# Patient Record
Sex: FEMALE | Race: BLACK OR AFRICAN AMERICAN | NOT HISPANIC OR LATINO | Employment: FULL TIME | URBAN - METROPOLITAN AREA
[De-identification: names, ages, dates, MRNs, and addresses within clinical notes are randomized per-mention and may not be internally consistent; named-entity substitution may affect disease eponyms.]

---

## 2023-10-21 ENCOUNTER — HOSPITAL ENCOUNTER (EMERGENCY)
Facility: HOSPITAL | Age: 33
Discharge: HOME | End: 2023-10-21
Attending: EMERGENCY MEDICINE
Payer: COMMERCIAL

## 2023-10-21 ENCOUNTER — APPOINTMENT (OUTPATIENT)
Dept: RADIOLOGY | Facility: HOSPITAL | Age: 33
End: 2023-10-21
Payer: COMMERCIAL

## 2023-10-21 VITALS
SYSTOLIC BLOOD PRESSURE: 131 MMHG | BODY MASS INDEX: 27.4 KG/M2 | OXYGEN SATURATION: 99 % | HEART RATE: 88 BPM | TEMPERATURE: 97.7 F | RESPIRATION RATE: 16 BRPM | HEIGHT: 69 IN | DIASTOLIC BLOOD PRESSURE: 86 MMHG | WEIGHT: 185 LBS

## 2023-10-21 DIAGNOSIS — M54.6 ACUTE BILATERAL THORACIC BACK PAIN: Primary | ICD-10-CM

## 2023-10-21 DIAGNOSIS — R07.9 CHEST PAIN, UNSPECIFIED TYPE: ICD-10-CM

## 2023-10-21 DIAGNOSIS — U07.1 COVID: ICD-10-CM

## 2023-10-21 LAB
ALBUMIN SERPL BCP-MCNC: 4.3 G/DL (ref 3.4–5)
ALP SERPL-CCNC: 44 U/L (ref 33–110)
ALT SERPL W P-5'-P-CCNC: 19 U/L (ref 7–45)
ANION GAP SERPL CALC-SCNC: 8 MMOL/L (ref 10–20)
AST SERPL W P-5'-P-CCNC: 23 U/L (ref 9–39)
BASOPHILS # BLD MANUAL: 0.03 X10*3/UL (ref 0–0.1)
BASOPHILS NFR BLD MANUAL: 1 %
BILIRUB SERPL-MCNC: 0.2 MG/DL (ref 0–1.2)
BUN SERPL-MCNC: 7 MG/DL (ref 6–23)
CALCIUM SERPL-MCNC: 8.7 MG/DL (ref 8.6–10.3)
CARDIAC TROPONIN I PNL SERPL HS: <3 NG/L (ref 0–13)
CHLORIDE SERPL-SCNC: 107 MMOL/L (ref 98–107)
CO2 SERPL-SCNC: 25 MMOL/L (ref 21–32)
CREAT SERPL-MCNC: 0.68 MG/DL (ref 0.5–1.05)
EOSINOPHIL # BLD MANUAL: 0.16 X10*3/UL (ref 0–0.7)
EOSINOPHIL NFR BLD MANUAL: 6 %
ERYTHROCYTE [DISTWIDTH] IN BLOOD BY AUTOMATED COUNT: 15.9 % (ref 11.5–14.5)
GFR SERPL CREATININE-BSD FRML MDRD: >90 ML/MIN/1.73M*2
GLUCOSE SERPL-MCNC: 96 MG/DL (ref 74–99)
HCT VFR BLD AUTO: 37.2 % (ref 36–46)
HGB BLD-MCNC: 12 G/DL (ref 12–16)
IMM GRANULOCYTES # BLD AUTO: 0 X10*3/UL (ref 0–0.7)
IMM GRANULOCYTES NFR BLD AUTO: 0 % (ref 0–0.9)
LYMPHOCYTES # BLD MANUAL: 1.51 X10*3/UL (ref 1.2–4.8)
LYMPHOCYTES NFR BLD MANUAL: 56 %
MCH RBC QN AUTO: 27.6 PG (ref 26–34)
MCHC RBC AUTO-ENTMCNC: 32.3 G/DL (ref 32–36)
MCV RBC AUTO: 86 FL (ref 80–100)
MONOCYTES # BLD MANUAL: 0.3 X10*3/UL (ref 0.1–1)
MONOCYTES NFR BLD MANUAL: 11 %
NEUTROPHILS # BLD MANUAL: 0.62 X10*3/UL (ref 1.2–7.7)
NEUTS BAND # BLD MANUAL: 0.05 X10*3/UL (ref 0–0.7)
NEUTS BAND NFR BLD MANUAL: 2 %
NEUTS SEG # BLD MANUAL: 0.57 X10*3/UL (ref 1.2–7)
NEUTS SEG NFR BLD MANUAL: 21 %
NRBC BLD-RTO: 0 /100 WBCS (ref 0–0)
OVALOCYTES BLD QL SMEAR: ABNORMAL
PLATELET # BLD AUTO: 203 X10*3/UL (ref 150–450)
PMV BLD AUTO: 10.1 FL (ref 7.5–11.5)
POTASSIUM SERPL-SCNC: 3.6 MMOL/L (ref 3.5–5.3)
PROT SERPL-MCNC: 7.8 G/DL (ref 6.4–8.2)
RBC # BLD AUTO: 4.34 X10*6/UL (ref 4–5.2)
RBC MORPH BLD: ABNORMAL
SARS-COV-2 RNA RESP QL NAA+PROBE: DETECTED
SODIUM SERPL-SCNC: 136 MMOL/L (ref 136–145)
TOTAL CELLS COUNTED BLD: 100
VARIANT LYMPHS # BLD MANUAL: 0.08 X10*3/UL (ref 0–0.5)
VARIANT LYMPHS NFR BLD: 3 %
WBC # BLD AUTO: 2.7 X10*3/UL (ref 4.4–11.3)

## 2023-10-21 PROCEDURE — 85027 COMPLETE CBC AUTOMATED: CPT

## 2023-10-21 PROCEDURE — 99284 EMERGENCY DEPT VISIT MOD MDM: CPT | Performed by: EMERGENCY MEDICINE

## 2023-10-21 PROCEDURE — 80053 COMPREHEN METABOLIC PANEL: CPT

## 2023-10-21 PROCEDURE — 99283 EMERGENCY DEPT VISIT LOW MDM: CPT | Mod: 25

## 2023-10-21 PROCEDURE — 2500000004 HC RX 250 GENERAL PHARMACY W/ HCPCS (ALT 636 FOR OP/ED)

## 2023-10-21 PROCEDURE — 87635 SARS-COV-2 COVID-19 AMP PRB: CPT

## 2023-10-21 PROCEDURE — 96360 HYDRATION IV INFUSION INIT: CPT

## 2023-10-21 PROCEDURE — 84484 ASSAY OF TROPONIN QUANT: CPT

## 2023-10-21 PROCEDURE — 85007 BL SMEAR W/DIFF WBC COUNT: CPT

## 2023-10-21 PROCEDURE — 71045 X-RAY EXAM CHEST 1 VIEW: CPT | Mod: FY

## 2023-10-21 PROCEDURE — 36415 COLL VENOUS BLD VENIPUNCTURE: CPT

## 2023-10-21 PROCEDURE — 71045 X-RAY EXAM CHEST 1 VIEW: CPT | Performed by: RADIOLOGY

## 2023-10-21 RX ORDER — ACETAMINOPHEN 325 MG/1
650 TABLET ORAL ONCE
Status: COMPLETED | OUTPATIENT
Start: 2023-10-21 | End: 2023-10-21

## 2023-10-21 RX ORDER — IBUPROFEN 600 MG/1
600 TABLET ORAL ONCE
Status: DISCONTINUED | OUTPATIENT
Start: 2023-10-21 | End: 2023-10-21

## 2023-10-21 RX ADMIN — SODIUM CHLORIDE, POTASSIUM CHLORIDE, SODIUM LACTATE AND CALCIUM CHLORIDE 1000 ML: 600; 310; 30; 20 INJECTION, SOLUTION INTRAVENOUS at 02:27

## 2023-10-21 RX ADMIN — ACETAMINOPHEN 650 MG: 325 TABLET ORAL at 02:26

## 2023-10-21 ASSESSMENT — LIFESTYLE VARIABLES
HAVE PEOPLE ANNOYED YOU BY CRITICIZING YOUR DRINKING: NO
HAVE YOU EVER FELT YOU SHOULD CUT DOWN ON YOUR DRINKING: NO
EVER FELT BAD OR GUILTY ABOUT YOUR DRINKING: NO
EVER HAD A DRINK FIRST THING IN THE MORNING TO STEADY YOUR NERVES TO GET RID OF A HANGOVER: NO
REASON UNABLE TO ASSESS: NO

## 2023-10-21 ASSESSMENT — PAIN DESCRIPTION - LOCATION
LOCATION: BACK
LOCATION: BACK

## 2023-10-21 ASSESSMENT — PAIN DESCRIPTION - FREQUENCY: FREQUENCY: CONSTANT/CONTINUOUS

## 2023-10-21 ASSESSMENT — COLUMBIA-SUICIDE SEVERITY RATING SCALE - C-SSRS
2. HAVE YOU ACTUALLY HAD ANY THOUGHTS OF KILLING YOURSELF?: NO
6. HAVE YOU EVER DONE ANYTHING, STARTED TO DO ANYTHING, OR PREPARED TO DO ANYTHING TO END YOUR LIFE?: NO
2. HAVE YOU ACTUALLY HAD ANY THOUGHTS OF KILLING YOURSELF?: NO
1. IN THE PAST MONTH, HAVE YOU WISHED YOU WERE DEAD OR WISHED YOU COULD GO TO SLEEP AND NOT WAKE UP?: NO
1. IN THE PAST MONTH, HAVE YOU WISHED YOU WERE DEAD OR WISHED YOU COULD GO TO SLEEP AND NOT WAKE UP?: NO

## 2023-10-21 ASSESSMENT — PAIN SCALES - GENERAL
PAINLEVEL_OUTOF10: 8
PAINLEVEL_OUTOF10: 8

## 2023-10-21 ASSESSMENT — HEART SCORE
TROPONIN: LESS THAN OR EQUAL TO NORMAL LIMIT
HISTORY: SLIGHTLY SUSPICIOUS
RISK FACTORS: 1-2 RISK FACTORS
HEART SCORE: 1
AGE: <45
ECG: NORMAL

## 2023-10-21 ASSESSMENT — PAIN DESCRIPTION - ORIENTATION: ORIENTATION: MID;LOWER

## 2023-10-21 ASSESSMENT — PAIN - FUNCTIONAL ASSESSMENT
PAIN_FUNCTIONAL_ASSESSMENT: 0-10
PAIN_FUNCTIONAL_ASSESSMENT: 0-10

## 2023-10-21 ASSESSMENT — PAIN DESCRIPTION - DESCRIPTORS: DESCRIPTORS: DISCOMFORT;ACHING

## 2023-10-21 NOTE — ED PROVIDER NOTES
EMERGENCY DEPARTMENT ENCOUNTER      Pt Name: Yonathan Alfredo Alqaddi  MRN: [id number]  Birthdate 1990  Date of evaluation: 10/21/2023  Provider: Howard Velasquez DO    CHIEF COMPLAINT       Chief Complaint   Patient presents with    Back Pain     Patient presents tonight with mid thoracic pain that has been ongoing for the last 2 days, no known injury         HISTORY OF PRESENT ILLNESS    33-year-old female comes to the emergency room with multiple medical complaints.  She been having pain across her lower thoracic back going on for 2 days.  No specific exacerbating or remitting factors.  She has been also having some chest discomfort over this time period as well but said that these 2 symptoms are not associated.  She is also been having some lightheadedness since last night specifically on positional changes such as standing up from sitting down.  Patient denies any medical problems, does not take any medications.  No history of coronary disease in the family, no history of aneurysms thoracic or abdominal for the patient or in the family.  Patient does smoke.  No regular drug or alcohol use.  No nausea, vomiting, fevers, chills, abdominal pain, urinary symptoms.  No black stools or red stools.  No other symptoms today.  No recent surgeries, travel, history of blood clots, systemic hormone therapy use.      History provided by:  Patient      Nursing Notes were reviewed.    PAST MEDICAL HISTORY     Past Medical History:   Diagnosis Date    Arthritis          SURGICAL HISTORY     History reviewed. No pertinent surgical history.      CURRENT MEDICATIONS       There are no discharge medications for this patient.      ALLERGIES     Patient has no known allergies.    FAMILY HISTORY     No family history on file.       SOCIAL HISTORY       Social History     Socioeconomic History    Marital status: Single     Spouse name: None    Number of children: None    Years of education: None    Highest education level: None    Occupational History    None   Tobacco Use    Smoking status: Every Day     Types: Cigarettes, Cigars    Smokeless tobacco: None   Substance and Sexual Activity    Alcohol use: Yes     Comment: sociallly    Drug use: Never    Sexual activity: None   Other Topics Concern    None   Social History Narrative    None     Social Determinants of Health     Financial Resource Strain: Not on file   Food Insecurity: Not on file   Transportation Needs: Not on file   Physical Activity: Not on file   Stress: Not on file   Social Connections: Not on file   Intimate Partner Violence: Not on file   Housing Stability: Not on file       SCREENINGS                        PHYSICAL EXAM    (up to 7 for level 4, 8 or more for level 5)     ED Triage Vitals [10/21/23 0140]   Temp Heart Rate Resp BP   36.5 °C (97.7 °F) 96 16 (!) 145/92      SpO2 Temp Source Heart Rate Source Patient Position   99 % Skin Monitor Sitting      BP Location FiO2 (%)     Right arm --       Physical Exam  Vitals and nursing note reviewed.   Constitutional:       General: She is not in acute distress.  HENT:      Head: Normocephalic and atraumatic.   Eyes:      General: No scleral icterus.        Right eye: No discharge.         Left eye: No discharge.      Conjunctiva/sclera: Conjunctivae normal.   Cardiovascular:      Rate and Rhythm: Normal rate and regular rhythm.      Pulses: Normal pulses.   Pulmonary:      Effort: Pulmonary effort is normal.   Abdominal:      General: Abdomen is flat.      Palpations: Abdomen is soft.      Tenderness: There is no abdominal tenderness. There is no guarding or rebound.   Musculoskeletal:         General: No deformity.      Right lower leg: No edema.      Left lower leg: No edema.   Skin:     General: Skin is warm and dry.   Neurological:      Mental Status: She is alert and oriented to person, place, and time. Mental status is at baseline.   Psychiatric:         Mood and Affect: Mood normal.         Behavior: Behavior  normal.          DIAGNOSTIC RESULTS     LABS:  Labs Reviewed   CBC WITH AUTO DIFFERENTIAL - Abnormal       Result Value    WBC 2.7 (*)     nRBC 0.0      RBC 4.34      Hemoglobin 12.0      Hematocrit 37.2      MCV 86      MCH 27.6      MCHC 32.3      RDW 15.9 (*)     Platelets 203      MPV 10.1      Immature Granulocytes %, Automated 0.0      Immature Granulocytes Absolute, Automated 0.00      Narrative:     The previously reported component Neutrophils % is no longer being reported.  The previously reported component Lymphocytes % is no longer being reported.  The previously reported component Monocytes % is no longer being reported.  The previously   reported component Eosinophils % is no longer being reported.  The previously reported component Basophils % is no longer being reported.  The previously reported component Absolute Neutrophils is no longer being reported.  The previously reported   component Absolute Lymphocytes is no longer being reported.  The previously reported component Absolute Monocytes is no longer being reported.  The previously reported component Absolute Eosinophils is no longer being reported.  The previously reported   component Absolute Basophils is no longer being reported.   COMPREHENSIVE METABOLIC PANEL - Abnormal    Glucose 96      Sodium 136      Potassium 3.6      Chloride 107      Bicarbonate 25      Anion Gap 8 (*)     Urea Nitrogen 7      Creatinine 0.68      eGFR >90      Calcium 8.7      Albumin 4.3      Alkaline Phosphatase 44      Total Protein 7.8      AST 23      Bilirubin, Total 0.2      ALT 19     SARS-COV-2 PCR, SYMPTOMATIC - Abnormal    Coronavirus 2019, PCR Detected (*)     Narrative:     This assay has received FDA Emergency Use Authorization (EUA) and is only authorized for the duration of time that circumstances exist to justify the authorization of the emergency use of in vitro diagnostic tests for the detection of SARS-CoV-2 virus and/or diagnosis of COVID-19  infection under section 564(b)(1) of the Act, 21 U.S.C. 360bbb-3(b)(1). This assay is an in vitro diagnostic nucleic acid amplification test for the qualitative detection of SARS-CoV-2 from nasopharyngeal specimens and has been validated for use at [facility]. Negative results do not preclude COVID-19 infections and should not be used as the sole basis for diagnosis, treatment, or other management decisions.     MANUAL DIFFERENTIAL - Abnormal    Neutrophils %, Manual 21.0      Bands %, Manual 2.0      Lymphocytes %, Manual 56.0      Monocytes %, Manual 11.0      Eosinophils %, Manual 6.0      Basophils %, Manual 1.0      Atypical Lymphocytes %, Manual 3.0      Seg Neutrophils Absolute, Manual 0.57 (*)     Bands Absolute, Manual 0.05      Lymphocytes Absolute, Manual 1.51      Monocytes Absolute, Manual 0.30      Eosinophils Absolute, Manual 0.16      Basophils Absolute, Manual 0.03      Atypical Lymphs Absolute, Manual 0.08      Total Cells Counted 100      Neutrophils Absolute, Manual 0.62 (*)     RBC Morphology See Below      Ovalocytes Few     TROPONIN I, HIGH SENSITIVITY - Normal    Troponin I, High Sensitivity <3      Narrative:     Less than 99th percentile of normal range cutoff-  Female and children under 18 years old <14 ng/L; Male <21 ng/L: Negative  Repeat testing should be performed if clinically indicated.     Female and children under 18 years old 14-50 ng/L; Male 21-50 ng/L:  Consistent with possible cardiac damage and possible increased clinical   risk. Serial measurements may help to assess extent of myocardial damage.     >50 ng/L: Consistent with cardiac damage, increased clinical risk and  myocardial infarction. Serial measurements may help assess extent of   myocardial damage.      NOTE: Children less than 1 year old may have higher baseline troponin   levels and results should be interpreted in conjunction with the overall   clinical context.     NOTE: Troponin I testing  is performed using a different   testing methodology at [facility] than at other   [facility]. Direct result comparisons should only   be made within the same method.       All other labs were within normal range or not returned as of this dictation.    Imaging  XR chest 1 view   Final Result   1.  No focal consolidation or sizable pleural effusion.                  MACRO:   None        Signed by: Debbie Griffin 10/21/2023 2:42 AM   Dictation workstation:   [id number]           Procedures  Procedures     EMERGENCY DEPARTMENT COURSE/MDM:     ED Course as of 10/21/23 0501   Sat Oct 21, 2023   0257 HEART Score: 1   [RD]   0257 PERC Rule:  Patient's age is less than 50.  Patient's heart rate is less than 100.  Patient's SaO2 on room air is greater than 94%.  Patient does not have a history of DVT or PE.  Patient does not have a history of surgery or trauma in the last 4 weeks.  Patient does not have a history of hemoptysis.  Patient does not have a history of using systemic hormone therapy.  Patient does not have unilateral leg swelling.  I do not have clinical suspicion of pulmonary embolism.  If patient is PERC negative there is less than 2% risk for PE.   [RD]      ED Course User Index  [RD] Howard Velasquez DO         Diagnoses as of 10/21/23 0501   Acute bilateral thoracic back pain   COVID   Chest pain, unspecified type        Medical Decision Making  33-year-old female comes to the emergency room for chest pain, back pain.  Given symptomatology, clinical scenario, initial suspicion was for musculoskeletal etiology, however cardiac work-up, EKG, chest x-ray was ordered to assess for other causes such as pneumonia, ACS.    On my independent interpretation of labs and imaging, patient's CBC was remarkable for a leukopenia for which I did order a COVID test given patient's that she was having a cough.  Chemistry was unremarkable.  No signs of pneumonia, pneumothorax on chest x-ray.    Patient had a  normal-appearing mediastinum on chest x-ray, was in no acute distress, had symmetrical palpable radial pulses bilaterally, I had no concern that patient's chest and back pain at this time was from a aortic dissection.    Patient was COVID-positive.  She was given Tylenol, a bolus of fluids in the ER for symptomatic management.    On reassessment she had improvement of her lightheadedness as well.  She was discharged home with PCP follow-up and will return for any new, concerning or worsening symptoms.    Amount and/or Complexity of Data Reviewed  Radiology: ordered and independent interpretation performed. Decision-making details documented in ED Course.  ECG/medicine tests: ordered and independent interpretation performed.        Patient and or family in agreement and understanding of treatment plan.  All questions answered.      I reviewed the case with the attending ED physician. The attending ED physician agrees with the plan. Patient and/or patient´s representative was counseled regarding labs, imaging, likely diagnosis, and plan. All questions were answered.    ED Medications administered this visit:    Medications   lactated Ringer's bolus 1,000 mL (0 mL intravenous Stopped 10/21/23 0327)   acetaminophen (Tylenol) tablet 650 mg (650 mg oral Given 10/21/23 0226)       New Prescriptions from this visit:    There are no discharge medications for this patient.      Follow-up:  George Thakur DO   [address]  [facility], [facility] [address]  [place] [address]  [phone]    Schedule an appointment as soon as possible for a visit           Final Impression:   1. Acute bilateral thoracic back pain    2. COVID    3. Chest pain, unspecified type          (Please note that portions of this note were completed with a voice recognition program.  Efforts were made to edit the dictations but occasionally words are mis-transcribed.)     Howard Velasquez DO  Resident  10/21/23 2009

## 2023-10-21 NOTE — Clinical Note
Yonathan Rainpanjeannette was seen and treated in our emergency department on 10/21/2023.  She may return to work on 10/25/2023.       If you have any questions or concerns, please don't hesitate to call.      Howard Velasquez, DO

## 2023-10-21 NOTE — DISCHARGE INSTRUCTIONS
Seek immediate medical attention if you develop: worsening chest pain, new chest pain, nausea, vomiting, weakness, numbness, tingling, excessive sweating, shortness of breath, difficulty breathing, loss of motion in your arms or legs, or any new or worsening symptoms.

## 2024-11-27 ENCOUNTER — APPOINTMENT (OUTPATIENT)
Dept: CARDIOLOGY | Facility: HOSPITAL | Age: 34
End: 2024-11-27
Payer: COMMERCIAL

## 2024-11-27 ENCOUNTER — HOSPITAL ENCOUNTER (EMERGENCY)
Facility: HOSPITAL | Age: 34
Discharge: OTHER INSTITUTION | End: 2024-11-28
Attending: INTERNAL MEDICINE
Payer: COMMERCIAL

## 2024-11-27 DIAGNOSIS — T14.91XA SUICIDE ATTEMPT (MULTI): ICD-10-CM

## 2024-11-27 DIAGNOSIS — R45.851 SUICIDAL IDEATION: Primary | ICD-10-CM

## 2024-11-27 LAB
ALBUMIN SERPL BCP-MCNC: 4.4 G/DL (ref 3.4–5)
ALP SERPL-CCNC: 48 U/L (ref 33–110)
ALT SERPL W P-5'-P-CCNC: 14 U/L (ref 7–45)
AMPHETAMINES UR QL SCN: ABNORMAL
ANION GAP SERPL CALC-SCNC: 15 MMOL/L (ref 10–20)
APAP SERPL-MCNC: <10 UG/ML
AST SERPL W P-5'-P-CCNC: 18 U/L (ref 9–39)
B-HCG SERPL-ACNC: <2 MIU/ML
BARBITURATES UR QL SCN: ABNORMAL
BASOPHILS # BLD AUTO: 0.1 X10*3/UL (ref 0–0.1)
BASOPHILS NFR BLD AUTO: 1.9 %
BENZODIAZ UR QL SCN: ABNORMAL
BILIRUB SERPL-MCNC: 0.3 MG/DL (ref 0–1.2)
BUN SERPL-MCNC: 6 MG/DL (ref 6–23)
BZE UR QL SCN: ABNORMAL
CALCIUM SERPL-MCNC: 8.9 MG/DL (ref 8.6–10.3)
CANNABINOIDS UR QL SCN: ABNORMAL
CHLORIDE SERPL-SCNC: 109 MMOL/L (ref 98–107)
CO2 SERPL-SCNC: 20 MMOL/L (ref 21–32)
CREAT SERPL-MCNC: 0.7 MG/DL (ref 0.5–1.05)
EGFRCR SERPLBLD CKD-EPI 2021: >90 ML/MIN/1.73M*2
EOSINOPHIL # BLD AUTO: 0.15 X10*3/UL (ref 0–0.7)
EOSINOPHIL NFR BLD AUTO: 2.8 %
ERYTHROCYTE [DISTWIDTH] IN BLOOD BY AUTOMATED COUNT: 17.9 % (ref 11.5–14.5)
ETHANOL SERPL-MCNC: 168 MG/DL
FENTANYL+NORFENTANYL UR QL SCN: ABNORMAL
GLUCOSE SERPL-MCNC: 82 MG/DL (ref 74–99)
HCT VFR BLD AUTO: 31.2 % (ref 36–46)
HGB BLD-MCNC: 9.9 G/DL (ref 12–16)
IMM GRANULOCYTES # BLD AUTO: 0.01 X10*3/UL (ref 0–0.7)
IMM GRANULOCYTES NFR BLD AUTO: 0.2 % (ref 0–0.9)
IRON SATN MFR SERPL: ABNORMAL %
IRON SERPL-MCNC: 10 UG/DL (ref 35–150)
LYMPHOCYTES # BLD AUTO: 1.99 X10*3/UL (ref 1.2–4.8)
LYMPHOCYTES NFR BLD AUTO: 36.9 %
MCH RBC QN AUTO: 25.6 PG (ref 26–34)
MCHC RBC AUTO-ENTMCNC: 31.7 G/DL (ref 32–36)
MCV RBC AUTO: 81 FL (ref 80–100)
METHADONE UR QL SCN: ABNORMAL
MONOCYTES # BLD AUTO: 0.54 X10*3/UL (ref 0.1–1)
MONOCYTES NFR BLD AUTO: 10 %
NEUTROPHILS # BLD AUTO: 2.61 X10*3/UL (ref 1.2–7.7)
NEUTROPHILS NFR BLD AUTO: 48.2 %
NRBC BLD-RTO: 0 /100 WBCS (ref 0–0)
OPIATES UR QL SCN: ABNORMAL
OXYCODONE+OXYMORPHONE UR QL SCN: ABNORMAL
PCP UR QL SCN: ABNORMAL
PLATELET # BLD AUTO: 365 X10*3/UL (ref 150–450)
POTASSIUM SERPL-SCNC: 3.2 MMOL/L (ref 3.5–5.3)
PROT SERPL-MCNC: 8 G/DL (ref 6.4–8.2)
RBC # BLD AUTO: 3.86 X10*6/UL (ref 4–5.2)
SALICYLATES SERPL-MCNC: <3 MG/DL
SODIUM SERPL-SCNC: 141 MMOL/L (ref 136–145)
TIBC SERPL-MCNC: ABNORMAL UG/DL
TSH SERPL-ACNC: 1.18 MIU/L (ref 0.44–3.98)
UIBC SERPL-MCNC: >450 UG/DL (ref 110–370)
WBC # BLD AUTO: 5.4 X10*3/UL (ref 4.4–11.3)

## 2024-11-27 PROCEDURE — 80320 DRUG SCREEN QUANTALCOHOLS: CPT | Performed by: INTERNAL MEDICINE

## 2024-11-27 PROCEDURE — 36415 COLL VENOUS BLD VENIPUNCTURE: CPT | Performed by: INTERNAL MEDICINE

## 2024-11-27 PROCEDURE — 99285 EMERGENCY DEPT VISIT HI MDM: CPT

## 2024-11-27 PROCEDURE — 84702 CHORIONIC GONADOTROPIN TEST: CPT | Performed by: INTERNAL MEDICINE

## 2024-11-27 PROCEDURE — 82607 VITAMIN B-12: CPT | Mod: PARLAB | Performed by: INTERNAL MEDICINE

## 2024-11-27 PROCEDURE — 80143 DRUG ASSAY ACETAMINOPHEN: CPT | Performed by: INTERNAL MEDICINE

## 2024-11-27 PROCEDURE — 83540 ASSAY OF IRON: CPT | Performed by: INTERNAL MEDICINE

## 2024-11-27 PROCEDURE — 80307 DRUG TEST PRSMV CHEM ANLYZR: CPT | Performed by: INTERNAL MEDICINE

## 2024-11-27 PROCEDURE — 80053 COMPREHEN METABOLIC PANEL: CPT | Performed by: INTERNAL MEDICINE

## 2024-11-27 PROCEDURE — 96372 THER/PROPH/DIAG INJ SC/IM: CPT

## 2024-11-27 PROCEDURE — 93005 ELECTROCARDIOGRAM TRACING: CPT

## 2024-11-27 PROCEDURE — 2500000004 HC RX 250 GENERAL PHARMACY W/ HCPCS (ALT 636 FOR OP/ED)

## 2024-11-27 PROCEDURE — 82746 ASSAY OF FOLIC ACID SERUM: CPT | Mod: PARLAB | Performed by: INTERNAL MEDICINE

## 2024-11-27 PROCEDURE — 84443 ASSAY THYROID STIM HORMONE: CPT | Performed by: INTERNAL MEDICINE

## 2024-11-27 PROCEDURE — 87389 HIV-1 AG W/HIV-1&-2 AB AG IA: CPT | Mod: PARLAB | Performed by: INTERNAL MEDICINE

## 2024-11-27 PROCEDURE — 86780 TREPONEMA PALLIDUM: CPT | Mod: PARLAB | Performed by: INTERNAL MEDICINE

## 2024-11-27 PROCEDURE — 85025 COMPLETE CBC W/AUTO DIFF WBC: CPT | Performed by: INTERNAL MEDICINE

## 2024-11-27 RX ORDER — HALOPERIDOL 5 MG/ML
INJECTION INTRAMUSCULAR
Status: COMPLETED
Start: 2024-11-27 | End: 2024-11-27

## 2024-11-27 RX ORDER — HALOPERIDOL 5 MG/ML
5 INJECTION INTRAMUSCULAR ONCE
Status: COMPLETED | OUTPATIENT
Start: 2024-11-27 | End: 2024-11-27

## 2024-11-27 RX ORDER — MIDAZOLAM HYDROCHLORIDE 5 MG/ML
INJECTION INTRAMUSCULAR; INTRAVENOUS
Status: COMPLETED
Start: 2024-11-27 | End: 2024-11-27

## 2024-11-27 RX ORDER — MIDAZOLAM HYDROCHLORIDE 5 MG/ML
5 INJECTION INTRAMUSCULAR; INTRAVENOUS ONCE
Status: COMPLETED | OUTPATIENT
Start: 2024-11-27 | End: 2024-11-27

## 2024-11-27 RX ORDER — LORAZEPAM 2 MG/1
2 TABLET ORAL EVERY 4 HOURS PRN
Status: DISCONTINUED | OUTPATIENT
Start: 2024-11-27 | End: 2024-11-28 | Stop reason: HOSPADM

## 2024-11-27 RX ADMIN — HALOPERIDOL LACTATE 5 MG: 5 INJECTION, SOLUTION INTRAMUSCULAR at 13:24

## 2024-11-27 RX ADMIN — MIDAZOLAM HYDROCHLORIDE 5 MG: 5 INJECTION INTRAMUSCULAR; INTRAVENOUS at 13:25

## 2024-11-27 RX ADMIN — MIDAZOLAM 5 MG: 5 INJECTION INTRAMUSCULAR; INTRAVENOUS at 13:25

## 2024-11-27 SDOH — HEALTH STABILITY: MENTAL HEALTH
HAVE YOU STARTED TO WORK OUT OR WORKED OUT THE DETAILS OF HOW TO KILL YOURSELF? DO YOU INTENT TO CARRY OUT THIS PLAN?: YES

## 2024-11-27 SDOH — HEALTH STABILITY: MENTAL HEALTH: SUICIDE ASSESSMENT: ADULT (C-SSRS)

## 2024-11-27 SDOH — SOCIAL STABILITY: SOCIAL NETWORK: EMOTIONAL SUPPORT GIVEN: REASSURE

## 2024-11-27 SDOH — HEALTH STABILITY: MENTAL HEALTH: BEHAVIORS/MOOD: SLEEPING

## 2024-11-27 SDOH — HEALTH STABILITY: MENTAL HEALTH: FOR HIGH RISK PATIENTS: ALL INTERVENTIONS ABOVE, PLUS:;1:1 PATIENT OBSERVER AT ALL TIMES

## 2024-11-27 SDOH — HEALTH STABILITY: MENTAL HEALTH: BEHAVIORS/MOOD: AGITATED;ANGRY;APPEARS INTOXICATED;TEARFUL;IRRITABLE;UNCOOPERATIVE

## 2024-11-27 SDOH — HEALTH STABILITY: MENTAL HEALTH: HAVE YOU BEEN THINKING ABOUT HOW YOU MIGHT DO THIS?: YES

## 2024-11-27 SDOH — HEALTH STABILITY: MENTAL HEALTH: BEHAVIORS/MOOD: CALM;COOPERATIVE

## 2024-11-27 SDOH — HEALTH STABILITY: MENTAL HEALTH: BEHAVIORAL HEALTH(WDL): EXCEPTIONS TO WDL

## 2024-11-27 SDOH — HEALTH STABILITY: MENTAL HEALTH: NEEDS EXPRESSED: EMOTIONAL

## 2024-11-27 SDOH — HEALTH STABILITY: MENTAL HEALTH: HAVE YOU ACTUALLY HAD ANY THOUGHTS OF KILLING YOURSELF?: YES

## 2024-11-27 SDOH — HEALTH STABILITY: MENTAL HEALTH: WAS THIS WITHIN THE PAST THREE MONTHS?: YES

## 2024-11-27 SDOH — HEALTH STABILITY: MENTAL HEALTH

## 2024-11-27 SDOH — HEALTH STABILITY: MENTAL HEALTH: CONTENT: BLAMING OTHERS

## 2024-11-27 SDOH — HEALTH STABILITY: MENTAL HEALTH: HAVE YOU EVER DONE ANYTHING, STARTED TO DO ANYTHING, OR PREPARED TO DO ANYTHING TO END YOUR LIFE?: YES

## 2024-11-27 SDOH — HEALTH STABILITY: MENTAL HEALTH: HAVE YOU WISHED YOU WERE DEAD OR WISHED YOU COULD GO TO SLEEP AND NOT WAKE UP?: YES

## 2024-11-27 SDOH — HEALTH STABILITY: MENTAL HEALTH: NEEDS EXPRESSED: PHYSICAL

## 2024-11-27 SDOH — HEALTH STABILITY: MENTAL HEALTH: HAVE YOU HAD THESE THOUGHTS AND HAD SOME INTENTION OF ACTING ON THEM?: YES

## 2024-11-27 SDOH — HEALTH STABILITY: MENTAL HEALTH
OTHER SUICIDE PRECAUTIONS INCLUDE: PATIENT PLACED IN AN EASILY OBSERVABLE ROOM WITH DOOR/CURTAIN REMAINING OPEN;PATIENT PLACED IN GOWN (SNAPS OR PAPER GOWNS PREFERRED) AND WANDED;REMAINING RISKS IDENTIFIED AND MITIGATED;PATIENT PLACED IN PSYCH SAFE ROOM (IF AVAILABLE);PROVIDER NOTIFIED;EL

## 2024-11-27 SDOH — HEALTH STABILITY: MENTAL HEALTH: RISK OF SUICIDE: HIGH RISK

## 2024-11-27 SDOH — HEALTH STABILITY: MENTAL HEALTH
OTHER SUICIDE PRECAUTIONS INCLUDE: PATIENT PLACED IN AN EASILY OBSERVABLE ROOM WITH DOOR/CURTAIN REMAINING OPEN;PATIENT PLACED IN GOWN (SNAPS OR PAPER GOWNS PREFERRED) AND WANDED;REMAINING RISKS IDENTIFIED AND MITIGATED;PATIENT PLACED IN PSYCH SAFE ROOM (IF AVAILABLE);PROVIDER NOTIFIED;ELOPEMENT RISK IDENTIFIED;FAMILY/VISITOR ADVISED TO MAINTAIN CONTROL OF OWN PERSONAL BELONGINGS IN ROOM;FREQUENT ROUNDING WITH IRREGULAR CHECKS AT MINIMUM OF EVERY 15 MINUTES TO ASSESS PSYCH SAFETY PERFORMED;HOME MEDICATION LIST COLLECTED AND SHARED WITH PROVIDER;HOURLY BEHAVIORAL ASSESSMENT PERFORMED;PERSONAL BELONGINGS SECURED;TREATMENT PLAN BASED ON RISK FACTORS DEVELOPED (ED ONLY - IF PATIENT IN ED MORE THAN 8 HOURS)

## 2024-11-27 ASSESSMENT — PAIN SCALES - GENERAL: PAINLEVEL_OUTOF10: 0 - NO PAIN

## 2024-11-27 NOTE — ED NOTES
Patient's mother, Anni, provided contact information for collateral information if needed [phone]     Suzan Navarro RN  11/27/24 0310

## 2024-11-27 NOTE — ED TRIAGE NOTES
Patient arrives with police for psychiatric evaluation. Patient is reportedly intoxicated and has made suicidal statements to family and was found running in and out of traffic. Uncooperative with police, hostile and attempting to bite officers. Patient is tearful, loud on arrival with repetitive speech, uncooperative and difficult to re-direct.
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
## 2024-11-28 VITALS
SYSTOLIC BLOOD PRESSURE: 116 MMHG | DIASTOLIC BLOOD PRESSURE: 77 MMHG | BODY MASS INDEX: 27.47 KG/M2 | TEMPERATURE: 97.7 F | HEIGHT: 67 IN | HEART RATE: 88 BPM | WEIGHT: 175 LBS | RESPIRATION RATE: 14 BRPM | OXYGEN SATURATION: 98 %

## 2024-11-28 LAB
FOLATE SERPL-MCNC: 22.9 NG/ML
HIV 1+2 AB+HIV1 P24 AG SERPL QL IA: NONREACTIVE
TREPONEMA PALLIDUM IGG+IGM AB [PRESENCE] IN SERUM OR PLASMA BY IMMUNOASSAY: NONREACTIVE
VIT B12 SERPL-MCNC: 679 PG/ML (ref 211–911)

## 2024-11-28 SDOH — HEALTH STABILITY: MENTAL HEALTH: BEHAVIORAL HEALTH(WDL): EXCEPTIONS TO WDL

## 2024-11-28 SDOH — HEALTH STABILITY: MENTAL HEALTH: BEHAVIORS/MOOD: CALM;COOPERATIVE

## 2024-11-28 SDOH — HEALTH STABILITY: MENTAL HEALTH: BEHAVIORS/MOOD: CALM

## 2024-11-28 SDOH — HEALTH STABILITY: MENTAL HEALTH: BEHAVIORS/MOOD: SLEEPING

## 2024-11-28 ASSESSMENT — PAIN SCALES - GENERAL
PAINLEVEL_OUTOF10: 0 - NO PAIN
PAINLEVEL_OUTOF10: 0 - NO PAIN

## 2024-11-28 NOTE — SIGNIFICANT EVENT
Application for Emergency Admission      Ready for Transfer?  Is the patient medically cleared for transfer to inpatient psychiatry: Yes  Has the patient been accepted to an inpatient psychiatric hospital: Yes    Application for Emergency Admission  IN ACCORDANCE WITH [place]  The Chief Clinical Officer of: Modesta Estrada 11/27/2024 .11:50 PM    Reason for Hospitalization  The undersigned has reason to believe that: Yonathan Ludwig Is a mentally ill person subject to hospitalization by court order under division B Section 5122.01 of [place] Revised Code, i.e., this person:    1.Yes  Represents a substantial risk of physical harm to self as manifested by evidence of threats of, or attempts at, suicide or serious self-inflicted bodily harm    2.No Represents a substantial risk of physical harm to others as manifested by evidence of recent homicidal or other violent behavior, evidence of recent threats that place another in reasonable fear of violent behavior and serious physical harm, or other evidence of present dangerousness    3.No Represents a substantial and immediate risk of serious physical impairment or injury to self as manifested by  evidence that the person is unable to provide for and is not providing for the person's basic physical needs because of the person's mental illness and that appropriate provision for those needs cannot be made  immediately available in the community    4.Yes Would benefit from treatment in a hospital for his mental illness and is in need of such treatment as manifested by evidence of behavior that creates a grave and imminent risk to substantial rights of others or  himself.    5.Yes Would benefit from treatment as manifested by evidence of behavior that indicates all of the following:       (a) The person is unlikely to survive safely in the community without supervision, based on a clinical determination.       (b) The person has a history of lack of  compliance with treatment for mental illness and one of the following applies:      (i) At least twice within the thirty-six months prior to the filing of an affidavit seeking court-ordered treatment of the person under section 5122.111 of the Revised Code, the lack of compliance has been a significant factor in necessitating hospitalization in a hospital or receipt of services in a forensic or other mental health unit of a correctional facility, provided that the thirty-six-month period shall be extended by the length of any hospitalization or incarceration of the person that occurred within the thirty-six-month period.      (ii) Within the forty-eight months prior to the filing of an affidavit seeking court-ordered treatment of the person under section 5122.111 of the Revised Code, the lack of compliance resulted in one or more acts of serious violent behavior toward self or others or threats of, or attempts at, serious physical harm to self or others, provided that the forty-eight-month period shall be extended by the length of any hospitalization or incarceration of the person that occurred within the forty-eight-month period.      (c) The person, as a result of mental illness, is unlikely to voluntarily participate in necessary treatment.       (d) In view of the person's treatment history and current behavior, the person is in need of treatment in order to prevent a relapse or deterioration that would be likely to result in substantial risk of serious harm to the person or others.    (e) Represents a substantial risk of physical harm to self or others if allowed to remain at liberty pending examination.    Therefore, it is requested that said person be admitted to the above named facility.    STATEMENT OF BELIEF    Must be filled out by one of the following: a psychiatrist, licensed physician, licensed clinical psychologist, health or ,  or .  (Statement shall include the  circumstances under which the individual was taken into custody and the reason for the person's belief that hospitalization is necessary. The statement shall also include a reference to efforts made to secure the individual's property at his residence if he was taken into custody there. Every reasonable and appropriate effort should be made to take this person into custody in the least conspicuous manner possible.)        Elbert Nguyen MD 11/27/2024     _____________________________________________________________   Place of Employment: [place]    STATEMENT OF OBSERVATION BY PSYCHIATRIST, LICENSED PHYSICIAN, OR LICENSED CLINICAL PSYCHOLOGIST, IF APPLICABLE    Place of Observation (e.g., [facility], general hospital, office, emergency facility)  (If applicable, please complete)    Elbert Nguyen MD 11/27/2024    _____________________________________________________________

## 2024-11-28 NOTE — ED PROVIDER NOTES
Patient accepted to psychiatric facility stable throughout stay here.     Elbert Nguyen MD  11/27/24 [id number]

## 2024-11-28 NOTE — ED NOTES
RN was given permission by patient to speak to her sirenadasyed Desir. Family expressed their concern for this patient to be discharged home. Mother Anni stated that the patient drinks heavily at least 3-4 times per week. Per patients Mom the patient tried to hang herself in the garage back in 2020 and repeatedly states she wants to kill herself. She is a mother of 5 young children whom are being looked after by her mother Anni.          Risa Jaquez RN  11/27/24 [id number]

## 2024-11-29 LAB
ATRIAL RATE: 85 BPM
P AXIS: 58 DEGREES
PR INTERVAL: 177 MS
Q ONSET: 249 MS
QRS COUNT: 14 BEATS
QRS DURATION: 93 MS
QT INTERVAL: 382 MS
QTC CALCULATION(BAZETT): 455 MS
QTC FREDERICIA: 429 MS
R AXIS: 48 DEGREES
T AXIS: 57 DEGREES
T OFFSET: 440 MS
VENTRICULAR RATE: 85 BPM
